# Patient Record
Sex: FEMALE
[De-identification: names, ages, dates, MRNs, and addresses within clinical notes are randomized per-mention and may not be internally consistent; named-entity substitution may affect disease eponyms.]

---

## 2018-02-28 ENCOUNTER — HOSPITAL ENCOUNTER (EMERGENCY)
Dept: HOSPITAL 14 - H.ER | Age: 43
Discharge: HOME | End: 2018-02-28
Payer: COMMERCIAL

## 2018-02-28 VITALS
SYSTOLIC BLOOD PRESSURE: 143 MMHG | DIASTOLIC BLOOD PRESSURE: 79 MMHG | OXYGEN SATURATION: 100 % | TEMPERATURE: 98 F | RESPIRATION RATE: 16 BRPM | HEART RATE: 87 BPM

## 2018-02-28 DIAGNOSIS — G89.29: ICD-10-CM

## 2018-02-28 DIAGNOSIS — M75.42: Primary | ICD-10-CM

## 2018-02-28 DIAGNOSIS — R20.2: ICD-10-CM

## 2018-02-28 NOTE — ED PDOC
Upper Extremity Pain/Injury


Time Seen by Provider: 02/28/18 21:46


Chief Complaint (Nursing): Upper Extremity Problem/Injury


Chief Complaint (Provider): Left Shoulder Pain


History Per: Patient


History/Exam Limitations: no limitations


Onset/Duration Of Symptoms: Days (few months)


Current Symptoms Are (Timing): Still Present


Additional Complaint(s): 


 Marimar is a 43 y/o female presents to ED with ongoing left shoulder pain 

patient for several months. S has had pain to posterior shoulder that radiates 

up to her neck and sometimes down her arm. Patient states 2 days ago she lifted 

heavy tiles into her home and felt exacerbation of pain. She states since then 

she has noticed tingling and numbness to the left side of her face and the left 

side of her neck as well as left shoulder. She was seen today at [facility] and 

MD there advised that she come to ED. Upon arrival patient states that Advil 

taken earlier did help with the discomfort. She denies any vision changes, 

headache, chest pain, shortness of breath or dyspnea on exertion.  Patient was 

seen by an orthopedist last week and was told to start course of physical 

therapy for shoulder. She states orthopedist told her she needed to complete 

physical therapy course before an MRI of the shoulder could be ordered.  

Patient has already had x-rays of left shoulder.





PMD: [place] Adair





Past Medical History


Reviewed: Historical Data, Nursing Documentation, Vital Signs


Vital Signs: 


 Last Vital Signs











Temp  98.0 F   02/28/18 21:31


 


Pulse  87   02/28/18 21:31


 


Resp  16   02/28/18 21:31


 


BP  143/79   02/28/18 21:31


 


Pulse Ox  100   02/28/18 21:31














- Medical History


PMH: No Chronic Diseases





- Surgical History


Surgical History: No Surg Hx





- Family History


Family History: States: No Known Family Hx





- Living Arrangements


Living Arrangements: With Family





- Social History


Current smoker - smoking cessation education provided: No


Alcohol: None


Drugs: Denies





- Allergies


Allergies/Adverse Reactions: 


 Allergies











Allergy/AdvReac Type Severity Reaction Status Date / Time


 


No Known Allergies Allergy   Verified 02/28/18 21:41














Review of Systems


ROS Statement: Except As Marked, All Systems Reviewed And Found Negative


Musculoskeletal: Positive for: Shoulder Pain (left with radiation down left arm 

and left side of neck and face)


Neurological: Positive for: Numbness (left face), Other (tingling left face)





Physical Exam





- Reviewed


Nursing Documentation Reviewed: Yes


Vital Signs Reviewed: Yes





- Physical Exam


Appears: Positive for: Well, Non-toxic, No Acute Distress


Head Exam: Positive for: ATRAUMATIC, NORMAL INSPECTION, NORMOCEPHALIC


Skin: Positive for: Normal Color, Warm, Dry


Eye Exam: Positive for: EOMI, Normal appearance, PERRL


ENT: Positive for: Normal ENT Inspection


Neck: Positive for: Normal, Painless ROM, Supple, Pain On Movement Of Neck


Cardiovascular/Chest: Positive for: Regular Rate, Rhythm.  Negative for: Murmur


Respiratory: Positive for: Normal Breath Sounds.  Negative for: Respiratory 

Distress


Extremity: Positive for: Normal ROM (of left shoulder with strong left hand 

).  Negative for: Pedal Edema


Neurologic/Psych: Positive for: Alert, CNs II-XII (grossly intact), Oriented.  

Negative for: Motor/Sensory Deficits, Aphasia, Facial Droop





- ECG


Interpretation Of ECG: 


Normal sinus rhythm 61 bpm, no acute changes, reviewed by PA and ED attending. 


O2 Sat by Pulse Oximetry: 100 (RA)


Pulse Ox Interpretation: Normal





Medical Decision Making


Medical Decision Making: 





Time: 21:55


Initial Impression: 43 y/o female with chronic left shoulder pain





Initial Plan:


--EKG





EKG is normal. No neuro deficits noted on exam. Patient was given referral to 

orthopedist. She was advised to continue with Advil for pain and to follow-up 

with the orthopedist as soon as possible.





--------------------------------------------------------------------------------

-----------------


Scribe Attestation:   


Documented by Navi Le, acting as a scribe for Yana Stevenson PA-C





Provider Scribe Attestation:


All medical record entries made by the Scribe were at my direction and 

personally dictated by me. I have reviewed the chart and agree that the record 

accurately reflects my personal performance of the history, physical exam, 

medical decision making, and the department course for this patient. I have 

also personally directed, reviewed, and agree with the discharge instructions 

and disposition.





Disposition





- Clinical Impression


Clinical Impression: 


 Shoulder impingement








- Patient ED Disposition


Is Patient to be Admitted: No


Counseled Patient/Family Regarding: Studies Performed, Diagnosis, Need For 

Followup, Rx Given





- Disposition


Referrals: 


Alejandra Quiroga MD [Staff Provider] - 


Disposition: Routine/Home


Disposition Time: 22:28


Condition: STABLE


Additional Instructions: 


Apply ice to affected area.  3 advil every 6 hrs for pain.  Follow up ASAP with 

orthopedist. 


Instructions:  Shoulder Impingement (DC)


Forms:  [facility] (English)

## 2018-03-02 NOTE — CARD
--------------- APPROVED REPORT --------------





EKG Measurement

Heart [id number]

MN [id number]

[id number]

[id number]

[id number]



<Conclusion>

Normal sinus rhythm

Cannot rule out Anterior infarct, age undetermined

Abnormal ECG